# Patient Record
Sex: MALE | Race: WHITE | Employment: UNEMPLOYED | URBAN - NONMETROPOLITAN AREA
[De-identification: names, ages, dates, MRNs, and addresses within clinical notes are randomized per-mention and may not be internally consistent; named-entity substitution may affect disease eponyms.]

---

## 2018-04-26 ENCOUNTER — HOSPITAL ENCOUNTER (EMERGENCY)
Age: 23
Discharge: HOME | End: 2018-04-26
Attending: FAMILY MEDICINE
Payer: COMMERCIAL

## 2018-04-26 VITALS
TEMPERATURE: 98.2 F | DIASTOLIC BLOOD PRESSURE: 75 MMHG | WEIGHT: 118.8 LBS | OXYGEN SATURATION: 100 % | HEART RATE: 62 BPM | RESPIRATION RATE: 20 BRPM | SYSTOLIC BLOOD PRESSURE: 125 MMHG

## 2018-04-26 DIAGNOSIS — H72.92 RUPTURED EARDRUM, LEFT: Primary | ICD-10-CM

## 2018-04-26 PROCEDURE — 99282 EMERGENCY DEPT VISIT SF MDM: CPT

## 2018-04-26 RX ORDER — NEOMYCIN SULFATE, POLYMYXIN B SULFATE AND HYDROCORTISONE 10; 3.5; 1 MG/ML; MG/ML; [USP'U]/ML
3 SUSPENSION/ DROPS AURICULAR (OTIC) 4 TIMES DAILY
Qty: 1 BOTTLE | Refills: 0 | Status: SHIPPED | OUTPATIENT
Start: 2018-04-26 | End: 2018-05-06

## 2018-04-26 RX ORDER — SULFAMETHOXAZOLE AND TRIMETHOPRIM 800; 160 MG/1; MG/1
1 TABLET ORAL 2 TIMES DAILY
Qty: 20 TABLET | Refills: 0 | Status: SHIPPED | OUTPATIENT
Start: 2018-04-26 | End: 2018-05-06

## 2018-04-26 ASSESSMENT — PAIN DESCRIPTION - PAIN TYPE: TYPE: ACUTE PAIN

## 2018-04-26 ASSESSMENT — PAIN DESCRIPTION - ORIENTATION: ORIENTATION: LEFT

## 2018-04-26 ASSESSMENT — PAIN DESCRIPTION - DESCRIPTORS: DESCRIPTORS: CONSTANT

## 2018-04-26 ASSESSMENT — PAIN DESCRIPTION - PROGRESSION: CLINICAL_PROGRESSION: GRADUALLY WORSENING

## 2018-04-26 ASSESSMENT — PAIN DESCRIPTION - ONSET: ONSET: ON-GOING

## 2018-04-26 ASSESSMENT — PAIN DESCRIPTION - LOCATION: LOCATION: EAR

## 2018-04-26 ASSESSMENT — PAIN DESCRIPTION - FREQUENCY: FREQUENCY: CONTINUOUS

## 2018-04-26 ASSESSMENT — PAIN SCALES - GENERAL: PAINLEVEL_OUTOF10: 5

## 2018-09-27 ENCOUNTER — HOSPITAL ENCOUNTER (EMERGENCY)
Age: 23
Discharge: HOME | End: 2018-09-27
Attending: FAMILY MEDICINE
Payer: COMMERCIAL

## 2018-09-27 VITALS
DIASTOLIC BLOOD PRESSURE: 58 MMHG | HEART RATE: 61 BPM | TEMPERATURE: 97.7 F | SYSTOLIC BLOOD PRESSURE: 113 MMHG | OXYGEN SATURATION: 100 % | RESPIRATION RATE: 15 BRPM

## 2018-09-27 DIAGNOSIS — R42 DIZZINESS: Primary | ICD-10-CM

## 2018-09-27 DIAGNOSIS — F12.90 MARIJUANA USE: ICD-10-CM

## 2018-09-27 DIAGNOSIS — G40.909 SEIZURE DISORDER (HCC): ICD-10-CM

## 2018-09-27 LAB
CHP ED QC CHECK: NORMAL
GLUCOSE BLD-MCNC: 127 MG/DL
GLUCOSE BLD-MCNC: 127 MG/DL (ref 65–99)

## 2018-09-27 PROCEDURE — 99284 EMERGENCY DEPT VISIT MOD MDM: CPT

## 2018-09-27 PROCEDURE — 6370000000 HC RX 637 (ALT 250 FOR IP): Performed by: FAMILY MEDICINE

## 2018-09-27 PROCEDURE — 82947 ASSAY GLUCOSE BLOOD QUANT: CPT

## 2018-09-27 RX ORDER — DIVALPROEX SODIUM 250 MG/1
500 TABLET, EXTENDED RELEASE ORAL ONCE
Status: COMPLETED | OUTPATIENT
Start: 2018-09-27 | End: 2018-09-27

## 2018-09-27 RX ADMIN — DIVALPROEX SODIUM 500 MG: 250 TABLET, EXTENDED RELEASE ORAL at 22:40

## 2018-09-27 ASSESSMENT — PAIN DESCRIPTION - LOCATION: LOCATION: HEAD

## 2018-09-27 ASSESSMENT — PAIN DESCRIPTION - DESCRIPTORS: DESCRIPTORS: ACHING

## 2018-09-27 ASSESSMENT — PAIN DESCRIPTION - FREQUENCY: FREQUENCY: CONTINUOUS

## 2018-09-27 ASSESSMENT — PAIN SCALES - GENERAL: PAINLEVEL_OUTOF10: 5

## 2018-09-27 ASSESSMENT — PAIN DESCRIPTION - ORIENTATION: ORIENTATION: LEFT

## 2018-09-27 ASSESSMENT — PAIN DESCRIPTION - PAIN TYPE: TYPE: ACUTE PAIN

## 2018-09-27 NOTE — LETTER
[facility] ED  [address] [address]  Phone: [phone]               September 27, 2018    Patient: Zo Mueller   YOB: 1995   Date of Visit: 9/27/2018       To Whom It May Concern:    Maci Judd III was seen and treated in our emergency department on 9/27/2018.        Sincerely,       Rush Jacob RN         Signature:__________________________________

## 2018-09-28 NOTE — ED NOTES
Pt ambulated the entire length of he ED without difficulty. Denied dizziness.       Isreal Runner, RN  09/27/18 [id number]

## 2018-09-28 NOTE — ED PROVIDER NOTES
 Drug use: Unknown    Sexual activity: Not on file     Other Topics Concern    Not on file     Social History Narrative    No narrative on file       PHYSICAL EXAM    VITAL SIGNS: BP (!) 113/58   Pulse 61   Temp 97.7 °F (36.5 °C) (Oral)   Resp 15   SpO2 100%    Constitutional:  Well developed, slender, 27 yo male with left forehead redness and cleft palate repaired, no acute distress. Eyes show pupils reactive. 4 mm. EOMI. No gross visual field defect to moving fingers. No significant nystagmus. HENT:  Trauma to left forehead with red kya size contusion, no skull defect , external ears normal, Left EAC with blue \"T-tube\" in EAC. TM nose normal, Right TM distorted. Oropharynx moist and no trauma. Upper cleft palate repair. Permanent on the upper bridge in the area of the cleft repair. Eyes show no hyphema, no periorbital injury. EOMI. No tearing. Neck- supple and moved well  Respiratory:  Clear lung sounds with no wheezes or rhonchi. No rib tenderness encountered. Cardiovascular:  Regular rate and rhythm with no murmur heard. PMI is left. No friction rub heard. GI:  Soft, nondistended, normal bowel sounds, nontender, no hepatomegaly, no rebound, no guarding   Musculoskeletal:  No swelling or tenderness of the extremities, no acute extremity deformitiy. Back- no thoracic or lumbar tenderness. Spine is grossly aligned. .  Integument:  Well hydrated, no rash or neck, chest or back  Neurologic:  Alert & oriented x 3, no gross motor deficits noted. Recognizes commands and shows controlled coordinated movements of the extremities. No ankle clonus. ED COURSE & MEDICAL DECISION MAKING    Pertinent Labs & Imaging studies reviewed. (See chart for details)  81-year-old male with marijuana use and seizure history. The patient had lightheadedness and fall after marijuana usage. No apparent seizure activity. He is alert and not postictal in the ER. Mild frontal head trauma at the forehead. Observed and found to be stable. Discussed compliance with medication. Dose of Depakote given. To continue Depakote tomorrow. Shows stable gait and clear thinking before discharge with mom. FINAL IMPRESSION    1. Dizziness  2. Marijuana use  3. Seizure disorder  4.   Noncompliance with medication        Andrew aMncuso DO  09/27/18 [id number]

## 2025-03-26 ENCOUNTER — HOSPITAL ENCOUNTER (EMERGENCY)
Age: 30
Discharge: HOME | End: 2025-03-26
Attending: FAMILY MEDICINE
Payer: COMMERCIAL

## 2025-03-26 VITALS
TEMPERATURE: 97.9 F | HEART RATE: 80 BPM | DIASTOLIC BLOOD PRESSURE: 91 MMHG | SYSTOLIC BLOOD PRESSURE: 139 MMHG | RESPIRATION RATE: 20 BRPM | OXYGEN SATURATION: 100 %

## 2025-03-26 DIAGNOSIS — S05.02XA ABRASION OF LEFT CORNEA, INITIAL ENCOUNTER: Primary | ICD-10-CM

## 2025-03-26 PROCEDURE — 99283 EMERGENCY DEPT VISIT LOW MDM: CPT

## 2025-03-26 PROCEDURE — 6370000000 HC RX 637 (ALT 250 FOR IP): Performed by: FAMILY MEDICINE

## 2025-03-26 RX ORDER — TETRACAINE HYDROCHLORIDE 5 MG/ML
1 SOLUTION OPHTHALMIC ONCE
Status: COMPLETED | OUTPATIENT
Start: 2025-03-26 | End: 2025-03-26

## 2025-03-26 RX ORDER — POLYMYXIN B SULFATE AND TRIMETHOPRIM 1; 10000 MG/ML; [USP'U]/ML
1 SOLUTION OPHTHALMIC EVERY 4 HOURS
Qty: 10 ML | Refills: 0 | Status: SHIPPED | OUTPATIENT
Start: 2025-03-26 | End: 2025-04-05

## 2025-03-26 RX ADMIN — TETRACAINE HYDROCHLORIDE 1 DROP: 5 SOLUTION OPHTHALMIC at 10:17

## 2025-03-26 ASSESSMENT — LIFESTYLE VARIABLES
HOW OFTEN DO YOU HAVE A DRINK CONTAINING ALCOHOL: NEVER
HOW MANY STANDARD DRINKS CONTAINING ALCOHOL DO YOU HAVE ON A TYPICAL DAY: PATIENT DOES NOT DRINK

## 2025-03-26 ASSESSMENT — PAIN - FUNCTIONAL ASSESSMENT: PAIN_FUNCTIONAL_ASSESSMENT: 0-10

## 2025-03-26 ASSESSMENT — ENCOUNTER SYMPTOMS
EYE REDNESS: 1
EYE PAIN: 1

## 2025-03-26 ASSESSMENT — PAIN DESCRIPTION - DESCRIPTORS: DESCRIPTORS: BURNING

## 2025-03-26 ASSESSMENT — PAIN DESCRIPTION - PAIN TYPE: TYPE: ACUTE PAIN

## 2025-03-26 ASSESSMENT — PAIN DESCRIPTION - FREQUENCY: FREQUENCY: INTERMITTENT

## 2025-03-26 ASSESSMENT — PAIN SCALES - GENERAL: PAINLEVEL_OUTOF10: 5

## 2025-03-26 ASSESSMENT — PAIN DESCRIPTION - ORIENTATION: ORIENTATION: LEFT

## 2025-03-26 ASSESSMENT — PAIN DESCRIPTION - LOCATION: LOCATION: EYE

## 2025-03-26 NOTE — ED PROVIDER NOTES
[facility]  EMERGENCY DEPARTMENT ENCOUNTER      Pt Name: Juan Pablo Loo  MRN: [id number]  Birthdate 1995  Date of evaluation: 3/26/2025  Provider: Pedro Fowler MD    CHIEF COMPLAINT       Chief Complaint   Patient presents with    Eye Problem     Left eye redness/burning for the last seven days         HISTORY OF PRESENT ILLNESS      Juan Pablo Loo is a 29 y.o. male who presents to the emergency department via private vehicle, patient with left eye redness and burning for the past several days, cannot think of any specific event including being struck in the eye or activities or any chemical exposures, later recalls that he was planning a baby and may have been hit in the eye by the child.  Patient does not wear glasses or contacts.  Patient has stated a little bit of discomfort in the eye and possibly some change in vision at times.  Patient has been trying over-the-counter eyedrops without relief.        REVIEW OF SYSTEMS       Review of Systems   Eyes:  Positive for pain and redness.   All other systems reviewed and are negative.        PAST MEDICAL HISTORY     Past Medical History:   Diagnosis Date    Cholesteatoma of ear     right ear    Ear infection     frequent otits media    Seizures (HCC)          SURGICAL HISTORY       Past Surgical History:   Procedure Laterality Date    CLEFT LIP REPAIR      CLEFT PALATE REPAIR      INNER EAR SURGERY      for cholestoma/reconstruction    PALATE SURGERY      few repair surgeries    TONSILLECTOMY      TYMPANOSTOMY TUBE PLACEMENT           CURRENT MEDICATIONS       Discharge Medication List as of 3/26/2025 10:49 AM        CONTINUE these medications which have NOT CHANGED    Details   Divalproex Sodium (DEPAKOTE PO) Take 750 mg by mouth dailyHistorical Med             ALLERGIES       Patient has no known allergies.    FAMILY HISTORY       History reviewed. No pertinent family history.       SOCIAL HISTORY       Social History     Tobacco Use